# Patient Record
Sex: MALE | Race: BLACK OR AFRICAN AMERICAN | Employment: OTHER | URBAN - METROPOLITAN AREA
[De-identification: names, ages, dates, MRNs, and addresses within clinical notes are randomized per-mention and may not be internally consistent; named-entity substitution may affect disease eponyms.]

---

## 2019-01-01 ENCOUNTER — APPOINTMENT (OUTPATIENT)
Dept: GENERAL RADIOLOGY | Age: 84
End: 2019-01-01
Payer: MEDICARE

## 2019-01-01 ENCOUNTER — HOSPITAL ENCOUNTER (INPATIENT)
Age: 84
LOS: 6 days | DRG: 951 | End: 2020-01-03
Attending: FAMILY MEDICINE | Admitting: INTERNAL MEDICINE
Payer: COMMERCIAL

## 2019-01-01 ENCOUNTER — APPOINTMENT (OUTPATIENT)
Dept: CT IMAGING | Age: 84
End: 2019-01-01
Payer: MEDICARE

## 2019-01-01 ENCOUNTER — HOSPITAL ENCOUNTER (EMERGENCY)
Age: 84
Discharge: ANOTHER HOSPITAL | End: 2019-12-20
Attending: EMERGENCY MEDICINE
Payer: MEDICARE

## 2019-01-01 VITALS
OXYGEN SATURATION: 94 % | DIASTOLIC BLOOD PRESSURE: 89 MMHG | RESPIRATION RATE: 18 BRPM | TEMPERATURE: 98 F | WEIGHT: 150 LBS | BODY MASS INDEX: 21.47 KG/M2 | SYSTOLIC BLOOD PRESSURE: 138 MMHG | HEART RATE: 83 BPM | HEIGHT: 70 IN

## 2019-01-01 DIAGNOSIS — R77.8 ELEVATED TROPONIN: ICD-10-CM

## 2019-01-01 DIAGNOSIS — S06.5XAA SUBDURAL HEMATOMA: ICD-10-CM
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
## 2019-01-01 DIAGNOSIS — I48.91 ATRIAL FIBRILLATION, UNSPECIFIED TYPE (HCC): ICD-10-CM

## 2019-01-01 DIAGNOSIS — R79.89 ELEVATED BRAIN NATRIURETIC PEPTIDE (BNP) LEVEL: ICD-10-CM

## 2019-01-01 DIAGNOSIS — J90 PLEURAL EFFUSION: ICD-10-CM

## 2019-01-01 LAB
ALBUMIN SERPL-MCNC: 3.4 GM/DL (ref 3.4–5)
ALP BLD-CCNC: 201 IU/L (ref 40–129)
ALT SERPL-CCNC: 20 U/L (ref 10–40)
ANION GAP SERPL CALCULATED.3IONS-SCNC: 10 MMOL/L (ref 4–16)
AST SERPL-CCNC: 33 IU/L (ref 15–37)
BACTERIA: NEGATIVE /HPF
BASOPHILS ABSOLUTE: 0.1 K/CU MM
BASOPHILS RELATIVE PERCENT: 1.1 % (ref 0–1)
BILIRUB SERPL-MCNC: 0.8 MG/DL (ref 0–1)
BILIRUBIN URINE: NEGATIVE MG/DL
BLOOD, URINE: NEGATIVE
BUN BLDV-MCNC: 30 MG/DL (ref 6–23)
CALCIUM OXALATE CRYSTALS: ABNORMAL /HPF
CALCIUM SERPL-MCNC: 9.2 MG/DL (ref 8.3–10.6)
CHLORIDE BLD-SCNC: 102 MMOL/L (ref 99–110)
CLARITY: CLEAR
CO2: 26 MMOL/L (ref 21–32)
COLOR: YELLOW
CREAT SERPL-MCNC: 1 MG/DL (ref 0.9–1.3)
DIFFERENTIAL TYPE: ABNORMAL
EKG DIAGNOSIS: NORMAL
EKG Q-T INTERVAL: 324 MS
EKG QRS DURATION: 86 MS
EKG QTC CALCULATION (BAZETT): 400 MS
EKG R AXIS: -27 DEGREES
EKG T AXIS: 85 DEGREES
EKG VENTRICULAR RATE: 92 BPM
EOSINOPHILS ABSOLUTE: 0.3 K/CU MM
EOSINOPHILS RELATIVE PERCENT: 3.6 % (ref 0–3)
GFR AFRICAN AMERICAN: >60 ML/MIN/1.73M2
GFR NON-AFRICAN AMERICAN: >60 ML/MIN/1.73M2
GLUCOSE BLD-MCNC: 96 MG/DL (ref 70–99)
GLUCOSE, URINE: NEGATIVE MG/DL
HCT VFR BLD CALC: 42.6 % (ref 42–52)
HEMOGLOBIN: 13.1 GM/DL (ref 13.5–18)
HYALINE CASTS: 2 /LPF
IMMATURE NEUTROPHIL %: 0.4 % (ref 0–0.43)
INR BLD: 1.07 INDEX
KETONES, URINE: NEGATIVE MG/DL
LEUKOCYTE ESTERASE, URINE: NEGATIVE
LYMPHOCYTES ABSOLUTE: 0.8 K/CU MM
LYMPHOCYTES RELATIVE PERCENT: 9.2 % (ref 24–44)
MAGNESIUM: 1.9 MG/DL (ref 1.8–2.4)
MCH RBC QN AUTO: 32.1 PG (ref 27–31)
MCHC RBC AUTO-ENTMCNC: 30.8 % (ref 32–36)
MCV RBC AUTO: 104.4 FL (ref 78–100)
MONOCYTES ABSOLUTE: 0.8 K/CU MM
MONOCYTES RELATIVE PERCENT: 9.7 % (ref 0–4)
MUCUS: ABNORMAL HPF
NITRITE URINE, QUANTITATIVE: NEGATIVE
NUCLEATED RBC %: 0 %
PDW BLD-RTO: 13.7 % (ref 11.7–14.9)
PH, URINE: 5 (ref 5–8)
PLATELET # BLD: 144 K/CU MM (ref 140–440)
PMV BLD AUTO: 9.9 FL (ref 7.5–11.1)
POTASSIUM SERPL-SCNC: 4.7 MMOL/L (ref 3.5–5.1)
PRO-BNP: 4210 PG/ML
PROTEIN UA: 30 MG/DL
PROTHROMBIN TIME: 13 SECONDS (ref 11.7–14.5)
RBC # BLD: 4.08 M/CU MM (ref 4.6–6.2)
RBC URINE: ABNORMAL /HPF (ref 0–3)
SEGMENTED NEUTROPHILS ABSOLUTE COUNT: 6.3 K/CU MM
SEGMENTED NEUTROPHILS RELATIVE PERCENT: 76 % (ref 36–66)
SODIUM BLD-SCNC: 138 MMOL/L (ref 135–145)
SPECIFIC GRAVITY UA: 1.02 (ref 1–1.03)
SQUAMOUS EPITHELIAL: <1 /HPF
T4 FREE: 0.97 NG/DL (ref 0.9–1.8)
TOTAL IMMATURE NEUTOROPHIL: 0.03 K/CU MM
TOTAL NUCLEATED RBC: 0 K/CU MM
TOTAL PROTEIN: 7.3 GM/DL (ref 6.4–8.2)
TRICHOMONAS: ABNORMAL /HPF
TROPONIN T: 0.02 NG/ML
TSH HIGH SENSITIVITY: 2.57 UIU/ML (ref 0.27–4.2)
UROBILINOGEN, URINE: 1 MG/DL (ref 0.2–1)
WBC # BLD: 8.3 K/CU MM (ref 4–10.5)
WBC UA: <1 /HPF (ref 0–2)

## 2019-01-01 PROCEDURE — 6360000002 HC RX W HCPCS: Performed by: PHYSICIAN ASSISTANT

## 2019-01-01 PROCEDURE — 6360000002 HC RX W HCPCS: Performed by: INTERNAL MEDICINE

## 2019-01-01 PROCEDURE — 94761 N-INVAS EAR/PLS OXIMETRY MLT: CPT

## 2019-01-01 PROCEDURE — 71045 X-RAY EXAM CHEST 1 VIEW: CPT

## 2019-01-01 PROCEDURE — 80053 COMPREHEN METABOLIC PANEL: CPT

## 2019-01-01 PROCEDURE — 83880 ASSAY OF NATRIURETIC PEPTIDE: CPT

## 2019-01-01 PROCEDURE — 72125 CT NECK SPINE W/O DYE: CPT

## 2019-01-01 PROCEDURE — 85610 PROTHROMBIN TIME: CPT

## 2019-01-01 PROCEDURE — 2700000000 HC OXYGEN THERAPY PER DAY

## 2019-01-01 PROCEDURE — 1250000000 HC SEMI PRIVATE HOSPICE R&B

## 2019-01-01 PROCEDURE — 93010 ELECTROCARDIOGRAM REPORT: CPT | Performed by: INTERNAL MEDICINE

## 2019-01-01 PROCEDURE — 2500000003 HC RX 250 WO HCPCS: Performed by: INTERNAL MEDICINE

## 2019-01-01 PROCEDURE — 83735 ASSAY OF MAGNESIUM: CPT

## 2019-01-01 PROCEDURE — 93005 ELECTROCARDIOGRAM TRACING: CPT | Performed by: PHYSICIAN ASSISTANT

## 2019-01-01 PROCEDURE — 81001 URINALYSIS AUTO W/SCOPE: CPT

## 2019-01-01 PROCEDURE — 70450 CT HEAD/BRAIN W/O DYE: CPT

## 2019-01-01 PROCEDURE — 84484 ASSAY OF TROPONIN QUANT: CPT

## 2019-01-01 PROCEDURE — 90471 IMMUNIZATION ADMIN: CPT | Performed by: PHYSICIAN ASSISTANT

## 2019-01-01 PROCEDURE — 84443 ASSAY THYROID STIM HORMONE: CPT

## 2019-01-01 PROCEDURE — 84439 ASSAY OF FREE THYROXINE: CPT

## 2019-01-01 PROCEDURE — 72170 X-RAY EXAM OF PELVIS: CPT

## 2019-01-01 PROCEDURE — 99291 CRITICAL CARE FIRST HOUR: CPT

## 2019-01-01 PROCEDURE — 90715 TDAP VACCINE 7 YRS/> IM: CPT | Performed by: PHYSICIAN ASSISTANT

## 2019-01-01 PROCEDURE — 6360000002 HC RX W HCPCS: Performed by: FAMILY MEDICINE

## 2019-01-01 PROCEDURE — 85025 COMPLETE CBC W/AUTO DIFF WBC: CPT

## 2019-01-01 RX ORDER — MORPHINE SULFATE 2 MG/ML
1 INJECTION, SOLUTION INTRAMUSCULAR; INTRAVENOUS
Status: DISCONTINUED | OUTPATIENT
Start: 2019-01-01 | End: 2020-01-01 | Stop reason: HOSPADM

## 2019-01-01 RX ORDER — BISACODYL 10 MG
10 SUPPOSITORY, RECTAL RECTAL DAILY PRN
Status: DISCONTINUED | OUTPATIENT
Start: 2019-01-01 | End: 2020-01-01 | Stop reason: HOSPADM

## 2019-01-01 RX ORDER — CLONIDINE 0.2 MG/24H
1 PATCH, EXTENDED RELEASE TRANSDERMAL WEEKLY
Status: DISCONTINUED | OUTPATIENT
Start: 2019-01-01 | End: 2019-01-01

## 2019-01-01 RX ORDER — HALOPERIDOL 5 MG/ML
3 INJECTION INTRAMUSCULAR ONCE
Status: COMPLETED | OUTPATIENT
Start: 2019-01-01 | End: 2019-01-01

## 2019-01-01 RX ORDER — LORAZEPAM 2 MG/ML
1.5 INJECTION INTRAMUSCULAR
Status: DISCONTINUED | OUTPATIENT
Start: 2019-01-01 | End: 2020-01-01 | Stop reason: HOSPADM

## 2019-01-01 RX ORDER — LORAZEPAM 2 MG/ML
0.5 INJECTION INTRAMUSCULAR
Status: DISCONTINUED | OUTPATIENT
Start: 2019-01-01 | End: 2020-01-01 | Stop reason: HOSPADM

## 2019-01-01 RX ORDER — GLYCOPYRROLATE 1 MG/5 ML
0.1 SYRINGE (ML) INTRAVENOUS
Status: DISCONTINUED | OUTPATIENT
Start: 2019-01-01 | End: 2020-01-01 | Stop reason: HOSPADM

## 2019-01-01 RX ORDER — MORPHINE SULFATE 2 MG/ML
1 INJECTION, SOLUTION INTRAMUSCULAR; INTRAVENOUS EVERY 4 HOURS PRN
Status: DISCONTINUED | OUTPATIENT
Start: 2019-01-01 | End: 2019-01-01

## 2019-01-01 RX ORDER — ACETAMINOPHEN 650 MG/1
650 SUPPOSITORY RECTAL EVERY 6 HOURS PRN
Status: DISCONTINUED | OUTPATIENT
Start: 2019-01-01 | End: 2020-01-01 | Stop reason: HOSPADM

## 2019-01-01 RX ORDER — HALOPERIDOL 5 MG/ML
1 INJECTION INTRAMUSCULAR
Status: DISCONTINUED | OUTPATIENT
Start: 2019-01-01 | End: 2020-01-01 | Stop reason: HOSPADM

## 2019-01-01 RX ORDER — IPRATROPIUM BROMIDE AND ALBUTEROL SULFATE 2.5; .5 MG/3ML; MG/3ML
1 SOLUTION RESPIRATORY (INHALATION) EVERY 4 HOURS PRN
Status: DISCONTINUED | OUTPATIENT
Start: 2019-01-01 | End: 2020-01-01 | Stop reason: HOSPADM

## 2019-01-01 RX ORDER — MORPHINE SULFATE 2 MG/ML
2 INJECTION, SOLUTION INTRAMUSCULAR; INTRAVENOUS
Status: DISCONTINUED | OUTPATIENT
Start: 2019-01-01 | End: 2020-01-01 | Stop reason: HOSPADM

## 2019-01-01 RX ADMIN — LORAZEPAM 0.5 MG: 2 INJECTION INTRAMUSCULAR; INTRAVENOUS at 21:00

## 2019-01-01 RX ADMIN — Medication 0.1 MG: at 18:10

## 2019-01-01 RX ADMIN — HALOPERIDOL LACTATE 1 MG: 5 INJECTION, SOLUTION INTRAMUSCULAR at 14:01

## 2019-01-01 RX ADMIN — Medication 0.1 MG: at 03:02

## 2019-01-01 RX ADMIN — HALOPERIDOL LACTATE 3 MG: 5 INJECTION INTRAMUSCULAR at 19:03

## 2019-01-01 RX ADMIN — MORPHINE SULFATE 1 MG: 2 INJECTION, SOLUTION INTRAMUSCULAR; INTRAVENOUS at 20:12

## 2019-01-01 RX ADMIN — LORAZEPAM 0.5 MG: 2 INJECTION INTRAMUSCULAR; INTRAVENOUS at 03:02

## 2019-01-01 RX ADMIN — MORPHINE SULFATE 1 MG: 2 INJECTION, SOLUTION INTRAMUSCULAR; INTRAVENOUS at 00:32

## 2019-01-01 RX ADMIN — LORAZEPAM 0.5 MG: 2 INJECTION INTRAMUSCULAR; INTRAVENOUS at 01:08

## 2019-01-01 RX ADMIN — HALOPERIDOL LACTATE 1 MG: 5 INJECTION, SOLUTION INTRAMUSCULAR at 02:15

## 2019-01-01 RX ADMIN — HALOPERIDOL LACTATE 1 MG: 5 INJECTION, SOLUTION INTRAMUSCULAR at 22:15

## 2019-01-01 RX ADMIN — MORPHINE SULFATE 1 MG: 2 INJECTION, SOLUTION INTRAMUSCULAR; INTRAVENOUS at 09:39

## 2019-01-01 RX ADMIN — MORPHINE SULFATE 1 MG: 2 INJECTION, SOLUTION INTRAMUSCULAR; INTRAVENOUS at 05:28

## 2019-01-01 RX ADMIN — HALOPERIDOL LACTATE 1 MG: 5 INJECTION, SOLUTION INTRAMUSCULAR at 20:13

## 2019-01-01 RX ADMIN — HALOPERIDOL LACTATE 1 MG: 5 INJECTION, SOLUTION INTRAMUSCULAR at 05:29

## 2019-01-01 RX ADMIN — MORPHINE SULFATE 2 MG: 2 INJECTION, SOLUTION INTRAMUSCULAR; INTRAVENOUS at 14:50

## 2019-01-01 RX ADMIN — HALOPERIDOL LACTATE 1 MG: 5 INJECTION, SOLUTION INTRAMUSCULAR at 09:39

## 2019-01-01 RX ADMIN — TETANUS TOXOID, REDUCED DIPHTHERIA TOXOID AND ACELLULAR PERTUSSIS VACCINE, ADSORBED 0.5 ML: 5; 2.5; 8; 8; 2.5 SUSPENSION INTRAMUSCULAR at 20:22

## 2019-01-01 RX ADMIN — MORPHINE SULFATE 1 MG: 2 INJECTION, SOLUTION INTRAMUSCULAR; INTRAVENOUS at 14:01

## 2019-01-01 RX ADMIN — Medication 0.1 MG: at 05:56

## 2019-01-01 RX ADMIN — Medication 0.1 MG: at 00:33

## 2019-01-01 RX ADMIN — MORPHINE SULFATE 1 MG: 2 INJECTION, SOLUTION INTRAMUSCULAR; INTRAVENOUS at 03:02

## 2019-01-01 RX ADMIN — MORPHINE SULFATE 1 MG: 2 INJECTION, SOLUTION INTRAMUSCULAR; INTRAVENOUS at 21:00

## 2019-01-01 RX ADMIN — Medication 0.1 MG: at 21:25

## 2019-01-01 RX ADMIN — HALOPERIDOL LACTATE 1 MG: 5 INJECTION, SOLUTION INTRAMUSCULAR at 11:39

## 2019-01-01 RX ADMIN — HALOPERIDOL LACTATE 1 MG: 5 INJECTION, SOLUTION INTRAMUSCULAR at 16:24

## 2019-01-01 RX ADMIN — HALOPERIDOL LACTATE 1 MG: 5 INJECTION, SOLUTION INTRAMUSCULAR at 17:59

## 2019-01-01 RX ADMIN — LORAZEPAM 0.5 MG: 2 INJECTION INTRAMUSCULAR; INTRAVENOUS at 05:56

## 2019-01-01 RX ADMIN — Medication 0.1 MG: at 01:08

## 2019-01-01 RX ADMIN — Medication 0.1 MG: at 20:59

## 2019-01-01 ASSESSMENT — PAIN SCALES - GENERAL
PAINLEVEL_OUTOF10: 0
PAINLEVEL_OUTOF10: 2
PAINLEVEL_OUTOF10: 6

## 2019-01-01 ASSESSMENT — PAIN DESCRIPTION - PROGRESSION
CLINICAL_PROGRESSION: GRADUALLY WORSENING

## 2019-01-01 ASSESSMENT — PAIN DESCRIPTION - PAIN TYPE
TYPE: ACUTE PAIN
TYPE: CHRONIC PAIN

## 2019-01-01 ASSESSMENT — PAIN DESCRIPTION - FREQUENCY: FREQUENCY: CONTINUOUS

## 2019-01-01 ASSESSMENT — PAIN DESCRIPTION - LOCATION
LOCATION: HEAD
LOCATION: HEAD

## 2019-01-01 ASSESSMENT — PAIN DESCRIPTION - DESCRIPTORS: DESCRIPTORS: ACHING

## 2019-01-01 ASSESSMENT — PAIN DESCRIPTION - ONSET: ONSET: ON-GOING

## 2019-12-29 NOTE — PLAN OF CARE
Problem: Risk for Impaired Skin Integrity  Goal: Tissue integrity - skin and mucous membranes  Description  Structural intactness and normal physiological function of skin and  mucous membranes.   12/28/2019 2349 by Francine Willard RN  Outcome: Ongoing  12/28/2019 2348 by Francine Willard RN  Outcome: Ongoing     Problem: Falls - Risk of:  Goal: Will remain free from falls  Description  Will remain free from falls  12/28/2019 2349 by Francine Willard RN  Outcome: Ongoing  12/28/2019 2348 by Francine Willard RN  Outcome: Ongoing  Goal: Absence of physical injury  Description  Absence of physical injury  12/28/2019 2349 by Francine Willard RN  Outcome: Ongoing  12/28/2019 2348 by Francine Willard RN  Outcome: Ongoing     Problem: Coping:  Goal: Ability to participate in care decisions during the dying process will improve  Description  Ability to participate in care decisions during the dying process will improve  Outcome: Ongoing  Goal: Family's ability to cope with current situation will improve  Description  Family's ability to cope with current situation will improve  Outcome: Ongoing     Problem: Health Behavior:  Goal: Identification of resources available to assist in meeting health care needs will improve  Description  Identification of resources available to assist in meeting health care needs will improve  Outcome: Ongoing     Problem: Physical Regulation:  Goal: Complications related to the disease process, condition or treatment will be avoided or minimized  Description  Complications related to the disease process, condition or treatment will be avoided or minimized  Outcome: Ongoing     Problem: Respiratory:  Goal: Verbalizations of increased ease of respirations will increase  Description  Verbalizations of increased ease of respirations will increase  Outcome: Ongoing     Problem: Role Relationship:  Goal: The number of positive interactions the caregiver has with the patient will increase  Description  The number of positive interactions the caregiver has with the patient will increase  Outcome: Ongoing     Problem: Sensory:  Goal: Expressions of feelings of enhanced comfort will increase  Description  Expressions of feelings of enhanced comfort will increase  Outcome: Ongoing     Problem: Skin Integrity:  Goal: Risk for impaired skin integrity will decrease  Description  Risk for impaired skin integrity will decrease  Outcome: Ongoing

## 2019-12-29 NOTE — H&P
Occupational History    Not on file   Social Needs    Financial resource strain: Not on file    Food insecurity:     Worry: Not on file     Inability: Not on file    Transportation needs:     Medical: Not on file     Non-medical: Not on file   Tobacco Use    Smoking status: Never Smoker    Smokeless tobacco: Never Used   Substance and Sexual Activity    Alcohol use: Not Currently    Drug use: Not on file    Sexual activity: Not on file   Lifestyle    Physical activity:     Days per week: Not on file     Minutes per session: Not on file    Stress: Not on file   Relationships    Social connections:     Talks on phone: Not on file     Gets together: Not on file     Attends Islam service: Not on file     Active member of club or organization: Not on file     Attends meetings of clubs or organizations: Not on file     Relationship status: Not on file    Intimate partner violence:     Fear of current or ex partner: Not on file     Emotionally abused: Not on file     Physically abused: Not on file     Forced sexual activity: Not on file   Other Topics Concern    Not on file   Social History Narrative    Not on file       Family History: family history is not on file. Pt unable to report. Old Records:  Reviewed from recent ED visit at [facility]. Advanced Directives:  DNR-comfort care    No Known Allergies    Medications - meds from [facility] reviewed at time orders given. ROS: Pt nonverbal and unable    Physical Exam:   /64   Pulse 92   Temp 96.9 °F (36.1 °C) (Axillary)   Resp 13   Ht 5' 10\" (1.778 m)   Wt 150 lb (68 kg)   SpO2 100%   BMI 21.52 kg/m²   General: Comfortable appearing thin elderly gentleman in bed with no grimace, furrowed brow or moaning. Mouth breathing. HEENT: Mucous membranes are dry, sclerae are clear/nonicteric. Heart: Nearly regular, no murmurs, readily palpable radial pulse. Lungs:  Distant, pt is breathing gently, no audible crackles or rhonchi.   Abdomen: soft, cervical spine. 2. Thickened secretions in the trachea, correlate with signs of aspiration.      Xr Chest 1 Vw     Result Date: 12/20/2019  EXAMINATION: ONE XRAY VIEW OF THE CHEST 12/20/2019 7:42 pm COMPARISON: None. HISTORY: ORDERING SYSTEM PROVIDED HISTORY: fall TECHNOLOGIST PROVIDED HISTORY: Reason for exam:->fall Reason for Exam: fall Acuity: Acute FINDINGS: Normal heart size and pulmonary vasculature are aortic calcifications. Small right pleural effusion with right basilar atelectasis. Left lung appears clear. No pneumothorax      Small right pleural effusion with right basilar atelectasis    Assesment/Plan:    1. 79 yo gentleman s/p traumatic subdural hematoma on 12/20. Now here for hospice care. 2. Restlessness- added ativan to see if helps, continue modest dose haldol, with 1X 3mg dose. 3. Dysphagia- nurses were giving po for pleasure. IV available for medications. 4. The patient is admitted to [address] [place] [address] for acute management of  end of life care   5. DVT prophylaxis: none indicated due to Hospice/end of life care    There is no problem list on file for this patient.        Electronically signed by Alan Banks MD on 12/29/2019 at 6:14 PM
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
## 2019-12-30 PROBLEM — S06.5XAA SUBDURAL HEMATOMA: Status: ACTIVE | Noted: 2019-01-01

## 2019-12-30 PROBLEM — I48.91 ATRIAL FIBRILLATION (HCC): Status: ACTIVE | Noted: 2019-01-01

## 2019-12-30 PROBLEM — S06.6XAA SUBARACHNOID HEMORRHAGE FOLLOWING INJURY: Status: ACTIVE | Noted: 2019-01-01

## 2019-12-30 PROBLEM — Z51.5 HOSPICE CARE: Status: ACTIVE | Noted: 2019-01-01

## 2020-01-01 VITALS
SYSTOLIC BLOOD PRESSURE: 103 MMHG | WEIGHT: 150 LBS | DIASTOLIC BLOOD PRESSURE: 66 MMHG | TEMPERATURE: 97.4 F | OXYGEN SATURATION: 93 % | HEIGHT: 70 IN | BODY MASS INDEX: 21.47 KG/M2 | RESPIRATION RATE: 12 BRPM | HEART RATE: 107 BPM

## 2020-01-01 PROCEDURE — 2700000000 HC OXYGEN THERAPY PER DAY

## 2020-01-01 PROCEDURE — 2500000003 HC RX 250 WO HCPCS: Performed by: INTERNAL MEDICINE

## 2020-01-01 PROCEDURE — 94761 N-INVAS EAR/PLS OXIMETRY MLT: CPT

## 2020-01-01 PROCEDURE — 6360000002 HC RX W HCPCS: Performed by: INTERNAL MEDICINE

## 2020-01-01 PROCEDURE — 6360000002 HC RX W HCPCS: Performed by: FAMILY MEDICINE

## 2020-01-01 PROCEDURE — 1250000000 HC SEMI PRIVATE HOSPICE R&B

## 2020-01-01 RX ADMIN — MORPHINE SULFATE 2 MG: 2 INJECTION, SOLUTION INTRAMUSCULAR; INTRAVENOUS at 15:44

## 2020-01-01 RX ADMIN — Medication 0.1 MG: at 20:21

## 2020-01-01 RX ADMIN — HALOPERIDOL LACTATE 1 MG: 5 INJECTION, SOLUTION INTRAMUSCULAR at 17:56

## 2020-01-01 RX ADMIN — MORPHINE SULFATE 2 MG: 2 INJECTION, SOLUTION INTRAMUSCULAR; INTRAVENOUS at 20:21

## 2020-01-01 RX ADMIN — MORPHINE SULFATE 2 MG: 2 INJECTION, SOLUTION INTRAMUSCULAR; INTRAVENOUS at 09:26

## 2020-01-01 RX ADMIN — MORPHINE SULFATE 2 MG: 2 INJECTION, SOLUTION INTRAMUSCULAR; INTRAVENOUS at 16:43

## 2020-01-01 RX ADMIN — LORAZEPAM 0.5 MG: 2 INJECTION INTRAMUSCULAR; INTRAVENOUS at 06:03

## 2020-01-01 RX ADMIN — LORAZEPAM 0.5 MG: 2 INJECTION INTRAMUSCULAR; INTRAVENOUS at 18:38

## 2020-01-01 ASSESSMENT — PAIN SCALES - GENERAL
PAINLEVEL_OUTOF10: 0
PAINLEVEL_OUTOF10: 0

## 2020-01-01 ASSESSMENT — PAIN DESCRIPTION - PROGRESSION
CLINICAL_PROGRESSION: GRADUALLY WORSENING

## 2020-01-02 PROBLEM — S06.5XAA TRAUMATIC SUBDURAL HEMATOMA OF NEURAXIS: Status: ACTIVE | Noted: 2020-01-01

## 2020-01-02 NOTE — PLAN OF CARE
Problem: Risk for Impaired Skin Integrity  Goal: Tissue integrity - skin and mucous membranes  Description  Structural intactness and normal physiological function of skin and  mucous membranes.   Outcome: Ongoing     Problem: Falls - Risk of:  Goal: Will remain free from falls  Description  Will remain free from falls  Outcome: Ongoing  Goal: Absence of physical injury  Description  Absence of physical injury  Outcome: Ongoing     Problem: Coping:  Goal: Ability to participate in care decisions during the dying process will improve  Description  Ability to participate in care decisions during the dying process will improve  Outcome: Ongoing  Goal: Family's ability to cope with current situation will improve  Description  Family's ability to cope with current situation will improve  Outcome: Ongoing     Problem: Health Behavior:  Goal: Identification of resources available to assist in meeting health care needs will improve  Description  Identification of resources available to assist in meeting health care needs will improve  Outcome: Ongoing     Problem: Physical Regulation:  Goal: Complications related to the disease process, condition or treatment will be avoided or minimized  Description  Complications related to the disease process, condition or treatment will be avoided or minimized  Outcome: Ongoing     Problem: Respiratory:  Goal: Verbalizations of increased ease of respirations will increase  Description  Verbalizations of increased ease of respirations will increase  Outcome: Ongoing     Problem: Role Relationship:  Goal: The number of positive interactions the caregiver has with the patient will increase  Description  The number of positive interactions the caregiver has with the patient will increase  Outcome: Ongoing     Problem: Sensory:  Goal: Expressions of feelings of enhanced comfort will increase  Description  Expressions of feelings of enhanced comfort will increase  Outcome: Ongoing

## 2020-01-03 NOTE — DISCHARGE SUMMARY
[address] [place] [address]    Death Summary    Date: 1/3/2020  Name: Davis Martinez  MRN: [id number]  YOB: 1915     Patient's PCP: No primary care provider on file. Admit Date: 12/28/2019 to [address] [place] [address] in [facility] from [facility]  Date of Death: 1/3/2020  Time: 4336  Acute care admission: 12/20 to 12/28/19 at [facility]   Admitting Physician: Dr. Cherie Young to [address] [place] [address]  Discharge Physician: Oly Hagen MD  Consultation: none during General Inpatient Hospice stay    Invasive procedures: none during General Inpatient Hospice stay    Discharge Diagnoses:  1. Traumatic subdural hematoma after fall at home 12/20/19, no neurosurgical intervention, and admitted to [address] [place] [address] on 12/28/19 for comfort focused care   2. Delirium  3. Atrial fibrillation      Patient Active Problem List   Diagnosis Code    Subdural hematoma ([address] S06.5X9A    Fall at home [address] Allyson Hood, Y92.009    Subarachnoid hemorrhage following injury ([address] S06.6X9A    Hospice care Z51.5    Atrial fibrillation ([address] I48.91    Traumatic subdural hematoma of neuraxis ([address] S06.5X9A       Brief History, reason for admission: Davis Martinez is a 80 y.o. male, who is admitted to [address] [place] [address] at [facility]   for hospice care after a fall with subsequent subdural hematoma. Pt was not on blood thinners and was conscious at initial eval. He was initially seen at the ED here 12/20 and was transferred to [facility]. After there was no plan for further measures, and pt unable to swallow with no desire for feeding tube, pt/family chose to receive hospice care closer to home. At initial ED visit pt noted in Afib, albumin and Cr normal.  Today pt has impaired mental status, not speaking or trying to speak, and floor nurse notes he is fidgity and toño with restlessness despite current meds. There was no complaint of pain.  Suctioned x 1 today

## 2020-01-03 NOTE — PLAN OF CARE
Problem: Risk for Impaired Skin Integrity  Goal: Tissue integrity - skin and mucous membranes  Description  Structural intactness and normal physiological function of skin and  mucous membranes.   1/2/2020 2342 by Briana Talamantes RN  Outcome: Ongoing  1/2/2020 1439 by Mateus Bauman RN  Outcome: Ongoing     Problem: Falls - Risk of:  Goal: Will remain free from falls  Description  Will remain free from falls  1/2/2020 2342 by Briana Talamantes RN  Outcome: Ongoing  1/2/2020 1439 by Mateus Bauman RN  Outcome: Ongoing  Goal: Absence of physical injury  Description  Absence of physical injury  1/2/2020 2342 by Briana Talamantes RN  Outcome: Ongoing  1/2/2020 1439 by Mateus Bauman RN  Outcome: Ongoing     Problem: Coping:  Goal: Ability to participate in care decisions during the dying process will improve  Description  Ability to participate in care decisions during the dying process will improve  1/2/2020 2342 by Briana Talamantes RN  Outcome: Ongoing  1/2/2020 1439 by Mateus Bauman RN  Outcome: Ongoing  Goal: Family's ability to cope with current situation will improve  Description  Family's ability to cope with current situation will improve  1/2/2020 2342 by Briana Talamantes RN  Outcome: Ongoing  1/2/2020 1439 by Mateus Bauman RN  Outcome: Ongoing     Problem: Health Behavior:  Goal: Identification of resources available to assist in meeting health care needs will improve  Description  Identification of resources available to assist in meeting health care needs will improve  1/2/2020 2342 by Briana Talamantes RN  Outcome: Ongoing  1/2/2020 1439 by Mateus Bauman RN  Outcome: Ongoing     Problem: Physical Regulation:  Goal: Complications related to the disease process, condition or treatment will be avoided or minimized  Description  Complications related to the disease process, condition or treatment will be avoided or minimized  1/2/2020 2342 by Briana Talamantes RN  Outcome:

## 2020-01-03 NOTE — PROGRESS NOTES
[address] [place] [address] Inpatient Hospice Progress Note    Date: 12/31/2019  Name: Jerrica Denise  MRN: [id number]  YOB: 1915   Patient's PCP: No primary care provider on file. Acute care admission: 12/20 to 12/28/19 at [facility] Date: 12/28/2019 to [facility] in [facility] from [place]    Subjective: The patient is unresponsive. There are no family here. . There is no facial grimacing. Objective:   Pain is managed with prn IV Morphine with 1 doses in the past 24 hours, Glycopyrrolate IV is available for secretions, and Lorazepam is available for anxiety with 2 doses in the past 24 hours, and Haloperidol for delirium with 0 doses in the past 24 hours. Data reviewed 12/31/2019:  CT-scan of the brain 12/20/19:  1. Acute subdural blood products along the left falx posteriorly measuring up   to 8 mm.  No mass effect identified. 2. Mild chronic microvascular ischemic changes and global cerebral atrophy. CT C spine 12/20/19  1. No evidence of acute fracture in the cervical spine. 2. Thickened secretions in the trachea, correlate with signs of aspiration. CT-scan of the brain at [place] 12/23/19:  1.4 CM ACUTE EXTRA-AXIAL HEMORRHAGE AT THE VERTEX SUPERIOR TO THE LEFT FRONTAL LOBE HAS NOT SIGNIFICANTLY CHANGED. NO OTHER SIGNIFICANT CHANGE.     Physical Exam:   /65   Pulse 108   Temp 98.9 °F (37.2 °C) (Axillary)   Resp 10   Ht 5' 10\" (1.778 m)   Wt 150 lb (68 kg)   SpO2 96%   BMI 21.52 kg/m²   General: unresponsive, no furrowed brow, the patient is thin  HEENT: Mucous membranes are dry, mild conjunctival pallor, bitemporal wasting  Chest: intercostal wasting   Heart: distant tones, tachycardic IRRR, S1S2, no murmurs  Lungs:  Distant breath sounds bilaterally, diminished at the bases, without rales, scattered rhonchi   Abdomen: soft, bowel sounds quietly present, no tenderness,
Dr. Marleny Valdez notified of pt arrival to room [phone].   Chuy Catalan, BSN, RN
Patients daughter called back at this time. She will not be coming into to hospital and the  home will be [place]. RN attempted to call donor referral line but the line range busy. Will attempt to call again in a few minutes.
Return call from patient's daughter Giselle Marin. Giselle Marin notified at this time of patients death. She is unsure if she will be coming to the hospital. She states she will call back in 10-15 minutes to let staff know if she is coming to the hospital and what the  home will be.
Tried to call daughter and she didn't answer left a message with a call back number. Will pass on to day shift nurse acosta.
rhonchi   Abdomen: soft, bowel sounds quietly present, no tenderness, nondistended  Extremities: Toes are cool, + mottling, no edema, no palpable cords, chronic changes in the legs  Neurologic: unresponsive    Assessment/Plan:     1. Traumatic subdural hematoma after fall at home 12/20/19, no neurosurgical intervention, and admitted to [address] [place] [address] on 12/28/19 for comfort focused care. Continue comfort meds. PPS 10%. Life expectancy is likely hours to days. 2. Delirium: Haloperidol   3. Atrial fibrillation  4.  should be notified at time of death due to fall contributing to death. 5. DNR-comfort care  6. DVT prophylaxis: none given subdural hematoma      Patient Active Problem List   Diagnosis Code    Subdural hematoma ([facility] M28.5V1B    Fall at home [address] Jojo Buck, Y92.009    Subarachnoid hemorrhage following injury ([facility] S06.6X9A    Hospice care Z51.5    Atrial fibrillation ([facility] I48.91       CANDELARIA Harding MD, [facility]  12/31/2019
the bases, without rales, scattered rhonchi   Abdomen: soft, bowel sounds quietly present, no tenderness, nondistended  Extremities: Toes remain cool, + mottling, no edema, no palpable cords, chronic changes in the legs  Neurologic: unresponsive    Assessment/Plan:     1. Traumatic subdural hematoma after fall at home 12/20/19, no neurosurgical intervention, and admitted to [address] [place] [address] on 12/28/19 for comfort focused care. Continue comfort meds. PPS 10%. Life expectancy is likely hours to days. 2. Delirium: Haloperidol   3. Atrial fibrillation  4.  should be notified at time of death due to fall contributing to death. 5. DNR-comfort care  6. DVT prophylaxis: none given subdural hematoma      Patient Active Problem List   Diagnosis Code    Subdural hematoma ([facility] [address] [facility] B36.3Z3Q    Fall at home [address] Juan Barahona, Y92.009    Subarachnoid hemorrhage following injury ([facility] [address] [facility] S06.6X9A    Hospice care Z51.5    Atrial fibrillation ([facility] [address] I48.91       CANDELARIA Coleman MD, [facility]  1/2/2020